# Patient Record
Sex: FEMALE | Race: AMERICAN INDIAN OR ALASKA NATIVE
[De-identification: names, ages, dates, MRNs, and addresses within clinical notes are randomized per-mention and may not be internally consistent; named-entity substitution may affect disease eponyms.]

---

## 2017-04-24 ENCOUNTER — HOSPITAL ENCOUNTER (EMERGENCY)
Dept: HOSPITAL 5 - ED | Age: 32
Discharge: HOME | End: 2017-04-24
Payer: COMMERCIAL

## 2017-04-24 VITALS — DIASTOLIC BLOOD PRESSURE: 66 MMHG | SYSTOLIC BLOOD PRESSURE: 119 MMHG

## 2017-04-24 DIAGNOSIS — O20.0: Primary | ICD-10-CM

## 2017-04-24 DIAGNOSIS — Z3A.01: ICD-10-CM

## 2017-04-24 LAB
BASOPHILS NFR BLD AUTO: 0.4 % (ref 0–1.8)
BILIRUB UR QL STRIP: (no result)
BLOOD UR QL VISUAL: (no result)
EOSINOPHIL NFR BLD AUTO: 2.9 % (ref 0–4.3)
HCT VFR BLD CALC: 34.7 % (ref 30.3–42.9)
HGB BLD-MCNC: 11 GM/DL (ref 10.1–14.3)
KETONES UR STRIP-MCNC: (no result) MG/DL
LEUKOCYTE ESTERASE UR QL STRIP: (no result)
MCH RBC QN AUTO: 27 PG (ref 28–32)
MCHC RBC AUTO-ENTMCNC: 32 % (ref 30–34)
MCV RBC AUTO: 84 FL (ref 79–97)
MUCOUS THREADS #/AREA URNS HPF: (no result) /HPF
NITRITE UR QL STRIP: (no result)
PH UR STRIP: 6 [PH] (ref 5–7)
PLATELET # BLD: 249 K/MM3 (ref 140–440)
PROT UR STRIP-MCNC: (no result) MG/DL
RBC # BLD AUTO: 4.14 M/MM3 (ref 3.65–5.03)
RBC #/AREA URNS HPF: 106 /HPF (ref 0–6)
UROBILINOGEN UR-MCNC: < 2 MG/DL (ref ?–2)
WBC # BLD AUTO: 9.4 K/MM3 (ref 4.5–11)
WBC #/AREA URNS HPF: 1 /HPF (ref 0–6)

## 2017-04-24 PROCEDURE — 76817 TRANSVAGINAL US OBSTETRIC: CPT

## 2017-04-24 PROCEDURE — 86901 BLOOD TYPING SEROLOGIC RH(D): CPT

## 2017-04-24 PROCEDURE — 81001 URINALYSIS AUTO W/SCOPE: CPT

## 2017-04-24 PROCEDURE — 86900 BLOOD TYPING SEROLOGIC ABO: CPT

## 2017-04-24 PROCEDURE — 76801 OB US < 14 WKS SINGLE FETUS: CPT

## 2017-04-24 PROCEDURE — 84702 CHORIONIC GONADOTROPIN TEST: CPT

## 2017-04-24 PROCEDURE — 86850 RBC ANTIBODY SCREEN: CPT

## 2017-04-24 PROCEDURE — 85025 COMPLETE CBC W/AUTO DIFF WBC: CPT

## 2017-04-24 PROCEDURE — 84703 CHORIONIC GONADOTROPIN ASSAY: CPT

## 2017-04-24 PROCEDURE — 36415 COLL VENOUS BLD VENIPUNCTURE: CPT

## 2017-04-24 NOTE — EMERGENCY DEPARTMENT REPORT
HPI





- General


Chief Complaint: Vaginal Bleeding


Time Seen by Provider: 17 12:55





- HPI


HPI: 





The patient is a 31-year-old , unknown EGA, female who presents for 

evaluation of abdominal pain and vaginal bleeding.  The patient reports 

abdominal pain for the past one week, midline pelvic and suprapubic in location

, cramping in quality, 5/10 in severity, constant since onset.  She has also 

experienced mild spotting of vaginal blood as well. The patient denies fever, 

chills, night sweats, diarrhea, blood in the stool, dark tarry stool, dysuria, 

hematuria, flank pain, genital discharge, inability to pass flatus. 








ED Past Medical Hx





- Past Medical History


Previous Medical History?: No





- Surgical History


Additional Surgical History: hernia repair





- Social History


Smoking Status: Never Smoker


Substance Use Type: Alcohol





- Medications


Home Medications: 


 Home Medications











 Medication  Instructions  Recorded  Confirmed  Last Taken  Type


 


Acetaminophen/Codeine [Tylenol #3] 1 tab PO Q6H PRN #7 tab 17  Unknown Rx


 


Cyanocobalamin/Folic Acid [Vitamin 1 tab PO QDAY 17 

History





B12-Folic Acid Tablet]     


 


Ondansetron [Zofran TAB] 4 mg PO Q8HR PRN #15 tablet 17  Unknown Rx


 


Prenatal Vit-Fe Fumar-FA [Prenatal 1 tab PO QDAY 17 

History





Vitamin]     


 


Pyridoxine [Vitamin B-6] 50 mg PO QDAY 17 History





    1 














ED Review of Systems


ROS: 


Stated complaint: POSS MISCARRIAGE


Other details as noted in HPI





Constitutional: denies: fever


ENT: denies: throat or neck pain


Respiratory: denies: cough, shortness of breath


Cardiovascular: denies: chest pain


Endocrine: denies unexplained weight loss or gain


Gastrointestinal: reports  abdominal pain, nausea


Genitourinary: reports VB denies: dysuria


Musculoskeletal: denies: leg swelling


Skin: denies: rash


Neurological: denies: headache


Hematological/Lymphatic: denies: easy bleeding or easy bruising  


Psych: denies sadness or hopelessness








Physical Exam





- Physical Exam


Vital Signs: 


 Vital Signs











  17





  12:44


 


Temperature 98.4 F


 


Pulse Rate 86


 


Respiratory 18





Rate 


 


Blood Pressure 130/83


 


O2 Sat by Pulse 100





Oximetry 











Physical Exam: 








General: well-nourished, well-developed, no acute distress


Head: Normocephalic, atraumatic


Eyes: normal sclera


ENT: Mucous membranes are pink and moist 


Neck: trachea midline, neck supple, No neck stiffness, no cervical adenopathy


Respiratory: Breath sounds equal bilaterally, no wheezing, rales, or rhonchi


Cardio: S1 and S2 present, no murmurs, rubs, gallops, capillary refill is brisk


Abdomen: Normoactive bowel sounds, soft abdomen, suprapubic abdominal pain, no 

rigidity, no guarding or rebound tenderness


Chest WALL/Back: No tenderness to palpation of the chest wall, no CVA 

tenderness with percussion


Musc: No pitting edema


Skin: No rash


Neuro: no facial drooping, normal speech


Psych: Normal affect








ED Course


 Vital Signs











  17





  12:44


 


Temperature 98.4 F


 


Pulse Rate 86


 


Respiratory 18





Rate 


 


Blood Pressure 130/83


 


O2 Sat by Pulse 100





Oximetry 














ED Medical Decision Making





- Lab Data


Result diagrams: 


 17 13:03








- Medical Decision Making





The patient was seen and examined by myself.  The patient is placed on a 

cardiac monitor and continuous pulse ox. On initial evaluation, the patient was 

found to be in no distress. Evaluation orders were placed.  The patient given a 

tablet of Tylenol for pain.  Lab results revealed hCG of 99, and otherwise are 

reassuring including non-concerning levels of RBC, hemoglobin, hematocrit, and 

platelets.  Ultrasound of the pelvis is negative for intrauterine pregnancy and 

is unable to rule out ectopic present.  The patient was reevaluated and 

reported that their symptoms were improved. the patient is stable for discharge 

with outpatient follow-up.  The patient is given follow-up and return 

instructions, including to obtain repeat beta hCG in 48 hours.  The patient 

expressed understanding and agreed with the plan.  The patient is discharged in 

stable condition.





Critical care attestation.: 


If time is entered above; I have spent that time in minutes in the direct care 

of this critically ill patient, excluding procedure time.








ED Disposition


Clinical Impression: 


 Threatened miscarriage in early pregnancy, Abdominal pain during pregnancy in 

first trimester





Disposition: DISCHARGED TO HOME OR SELFCARE


Is pt being admited?: No


Does the pt Need Aspirin: No


Condition: Stable


Instructions:  Threatened Miscarriage (ED), Abdominal Pain (ED)


Additional Instructions: 


Your ultrasound was unable to identify a normal intrauterine pregnancy, and 

also was not able to rule out an ectopic pregnancy. Make sure to follow-up with 

your OB/GYN within the next 48 hours for repeat B-HCG testing and trending.  

Your beta hCG level should double in 2 days if your pregnancy is progressing as 

normal. You could have an ectopic pregnancy and you must immediately present to 

an emergency department should you develop worsening of your symptoms or severe 

pain, vaginal bleeding, lightheadedness, passing out, confusion, or fever.


Referrals: 


[facility] [Provider Group] - 3-5 Days


Time of Disposition: 17:33

## 2017-04-24 NOTE — ULTRASOUND REPORT
ULTRASOUND OB LESS THAN 14 WEEKS - TRANSABDOMINAL AND TRANSVAGINAL



INDICATION: Pregnant, abdominal pain. Serum beta-hCG of 99.84 units.



COMPARISON: None similar during this gestation.



FINDINGS: Transabdominal and transvaginal pregnant pelvic sonography 

performed in this patient with LMP of 3/12/2017 and estimated menstrual 

age of 6 weeks and 1 day and EDC of 12/17/2017. 



An anteverted uterus measuring approximately 9.8 x 5.9 x 6.4 cm 

demonstrates endometrial thickness of approximately 1.1 cm towards the 

fundus, endovaginal image 4.  Approximately 0.9 x 0.4 cm hypoechoic 

fibroid may be present anteriorly, endovaginal image 10.  No 

significant pelvic free fluid.



Right ovary is 3.2 x 2.2 x 4.9 while the left ovary is 3.9 x 2.2 x 3.4 

cm.



CONCLUSION:



1. No sonographic confirmation of a viable intrauterine gestation at 

this time, as described.



2. Small fibroid possible and both ovaries visualized, as above.



Please also correlate clinically for accuracy of the LMP and with 

followup serum beta-hCG values, as warranted.



Thank you for the opportunity to participate in this patient's care.

## 2017-04-24 NOTE — EMERGENCY DEPARTMENT REPORT
Entered by MAXWELL AVILA, acting as scribe for MIKE ANDERSON PA.


Chief Complaint: Vaginal Bleeding


Stated Complaint: POSS MISCARRIAGE


Time Seen by Provider: 04/24/17 12:48





- HPI


History of Present Illness: 


Patient presents to the ED c/o vaginal bleeding and clotting that started this 

morning. Pt states that she has only changed her feminine pad once today. 

Denies abdominal pain, back pain, urinary frequency, urgency, dysuria, nausea, 

and vomiting. Notes she is currently pregnant, but is doesn't know how far 

along she is.





- ROS


Review of Systems: 


All system are negative unless stated in HPI above.








- Exam


Vital Signs: 


 Vital Signs











  04/24/17





  12:44


 


Temperature 98.4 F


 


Pulse Rate 86


 


Respiratory 18





Rate 


 


Blood Pressure 130/83


 


O2 Sat by Pulse 100





Oximetry 











Physical Exam: 


General: well nourished, well developed, nontoxic in appearance, in no acute 

distress


Abdomen: soft, non-tender, non-distended.


Cardiovascular: S1/S2 regular rate and rhythm. No murmur.


MSE screening note: 


Focused history and physical exam performed.


Due to findings the following was ordered:











ED Medical Decision Making





- Medical Decision Making





Medical decision making: Patient seen by provider in triage area.  Appropriate 

protocol activated and patient to main ED to be seen by provider








ED Disposition for MSE


Condition: Stable





This documentation as recorded by the scribe,MAXWELL AVILA,accurately 

reflects the service I personally performed and the decisions made by me,MIKE ANDERSON PA.

## 2017-12-11 ENCOUNTER — HOSPITAL ENCOUNTER (OUTPATIENT)
Dept: HOSPITAL 5 - TRG | Age: 32
Discharge: HOME | End: 2017-12-11
Attending: OBSTETRICS & GYNECOLOGY
Payer: MEDICAID

## 2017-12-11 VITALS — SYSTOLIC BLOOD PRESSURE: 100 MMHG | DIASTOLIC BLOOD PRESSURE: 60 MMHG

## 2017-12-11 DIAGNOSIS — O47.03: Primary | ICD-10-CM

## 2017-12-11 DIAGNOSIS — Z3A.28: ICD-10-CM

## 2017-12-11 LAB
BACTERIA #/AREA URNS HPF: (no result) /HPF
BILIRUB UR QL STRIP: (no result)
BLOOD UR QL VISUAL: (no result)
KETONES UR STRIP-MCNC: (no result) MG/DL
LEUKOCYTE ESTERASE UR QL STRIP: (no result)
MUCOUS THREADS #/AREA URNS HPF: (no result) /HPF
NITRITE UR QL STRIP: (no result)
PH UR STRIP: 6 [PH] (ref 5–7)
RBC #/AREA URNS HPF: 2 /HPF (ref 0–6)
URINE DRUGS OF ABUSE NOTE: (no result)
UROBILINOGEN UR-MCNC: < 2 MG/DL (ref ?–2)
WBC #/AREA URNS HPF: 1 /HPF (ref 0–6)

## 2017-12-11 PROCEDURE — 81001 URINALYSIS AUTO W/SCOPE: CPT

## 2017-12-11 PROCEDURE — 80307 DRUG TEST PRSMV CHEM ANLYZR: CPT

## 2018-01-03 ENCOUNTER — HOSPITAL ENCOUNTER (OUTPATIENT)
Dept: HOSPITAL 5 - TRG | Age: 33
Discharge: HOME | End: 2018-01-03
Attending: OBSTETRICS & GYNECOLOGY
Payer: COMMERCIAL

## 2018-01-03 VITALS — DIASTOLIC BLOOD PRESSURE: 54 MMHG | SYSTOLIC BLOOD PRESSURE: 96 MMHG

## 2018-01-03 DIAGNOSIS — O47.03: Primary | ICD-10-CM

## 2018-01-03 DIAGNOSIS — Z3A.31: ICD-10-CM

## 2018-01-03 LAB
BACTERIA #/AREA URNS HPF: (no result) /HPF
BILIRUB UR QL STRIP: (no result)
BLOOD UR QL VISUAL: (no result)
MUCOUS THREADS #/AREA URNS HPF: (no result) /HPF
NITRITE UR QL STRIP: (no result)
PH UR STRIP: 6 [PH] (ref 5–7)
RBC #/AREA URNS HPF: 4 /HPF (ref 0–6)
UROBILINOGEN UR-MCNC: < 2 MG/DL (ref ?–2)
WBC #/AREA URNS HPF: 1 /HPF (ref 0–6)

## 2018-01-03 PROCEDURE — 81001 URINALYSIS AUTO W/SCOPE: CPT

## 2018-01-03 PROCEDURE — 59025 FETAL NON-STRESS TEST: CPT
